# Patient Record
Sex: FEMALE | URBAN - METROPOLITAN AREA
[De-identification: names, ages, dates, MRNs, and addresses within clinical notes are randomized per-mention and may not be internally consistent; named-entity substitution may affect disease eponyms.]

---

## 2020-08-24 ENCOUNTER — APPOINTMENT (RX ONLY)
Dept: URBAN - METROPOLITAN AREA CLINIC 86 | Facility: CLINIC | Age: 60
Setting detail: DERMATOLOGY
End: 2020-08-24

## 2020-08-24 VITALS — TEMPERATURE: 97.3 F

## 2020-08-24 DIAGNOSIS — L20.89 OTHER ATOPIC DERMATITIS: ICD-10-CM

## 2020-08-24 PROBLEM — L20.84 INTRINSIC (ALLERGIC) ECZEMA: Status: ACTIVE | Noted: 2020-08-24

## 2020-08-24 PROCEDURE — ? PRESCRIPTION

## 2020-08-24 PROCEDURE — ? TREATMENT REGIMEN

## 2020-08-24 PROCEDURE — ? COUNSELING

## 2020-08-24 PROCEDURE — 99202 OFFICE O/P NEW SF 15 MIN: CPT

## 2020-08-24 RX ORDER — DESONIDE 0.5 MG/ML
LOTION TOPICAL BID
Qty: 1 | Refills: 11 | COMMUNITY
Start: 2020-08-24

## 2020-08-24 RX ORDER — TRIAMCINOLONE ACETONIDE 1 MG/G
CREAM TOPICAL BID
Qty: 1 | Refills: 3 | COMMUNITY
Start: 2020-08-24

## 2020-08-24 RX ADMIN — DESONIDE: 0.5 LOTION TOPICAL at 00:00

## 2020-08-24 RX ADMIN — TRIAMCINOLONE ACETONIDE: 1 CREAM TOPICAL at 00:00

## 2020-08-24 ASSESSMENT — BSA RASH: BSA RASH: 5

## 2020-08-24 ASSESSMENT — LOCATION SIMPLE DESCRIPTION DERM
LOCATION SIMPLE: LEFT CHEEK
LOCATION SIMPLE: LEFT ANTERIOR NECK

## 2020-08-24 ASSESSMENT — LOCATION ZONE DERM
LOCATION ZONE: NECK
LOCATION ZONE: FACE

## 2020-08-24 ASSESSMENT — LOCATION DETAILED DESCRIPTION DERM
LOCATION DETAILED: LEFT INFERIOR CENTRAL MALAR CHEEK
LOCATION DETAILED: LEFT INFERIOR ANTERIOR NECK

## 2020-08-24 ASSESSMENT — SEVERITY ASSESSMENT 2020: SEVERITY 2020: MILD

## 2020-08-24 NOTE — PROCEDURE: TREATMENT REGIMEN
Otc Regimen: Cerave cream apply qd
Initiate Treatment: Desonide lotion apply bid x 2 wks then weekdays only \\nTAC 0.1% cream apply bid x 2 wks then weekdays only
Plan: Limit soap use
Detail Level: Zone

## 2020-08-24 NOTE — HPI: RASH
How Severe Is Your Rash?: mild
Is This A New Presentation, Or A Follow-Up?: Rash
Additional History: Has had in the past, what was given in the past helped.